# Patient Record
Sex: MALE | Race: WHITE | NOT HISPANIC OR LATINO | Employment: UNEMPLOYED | URBAN - NONMETROPOLITAN AREA
[De-identification: names, ages, dates, MRNs, and addresses within clinical notes are randomized per-mention and may not be internally consistent; named-entity substitution may affect disease eponyms.]

---

## 2019-01-18 ENCOUNTER — APPOINTMENT (OUTPATIENT)
Dept: GENERAL RADIOLOGY | Facility: HOSPITAL | Age: 29
End: 2019-01-18

## 2019-01-18 ENCOUNTER — HOSPITAL ENCOUNTER (EMERGENCY)
Facility: HOSPITAL | Age: 29
Discharge: HOME | End: 2019-01-18
Attending: EMERGENCY MEDICINE | Admitting: EMERGENCY MEDICINE

## 2019-01-18 VITALS
TEMPERATURE: 98.2 F | WEIGHT: 230 LBS | HEIGHT: 75 IN | OXYGEN SATURATION: 98 % | DIASTOLIC BLOOD PRESSURE: 87 MMHG | SYSTOLIC BLOOD PRESSURE: 137 MMHG | HEART RATE: 73 BPM | BODY MASS INDEX: 28.6 KG/M2 | RESPIRATION RATE: 18 BRPM

## 2019-01-18 DIAGNOSIS — R00.2 PALPITATIONS: Primary | ICD-10-CM

## 2019-01-18 DIAGNOSIS — Z20.7 SCABIES EXPOSURE: ICD-10-CM

## 2019-01-18 LAB
ALBUMIN SERPL-MCNC: 5.1 G/DL (ref 3.5–5)
ALBUMIN/GLOB SERPL: 1.8 G/DL (ref 1–2)
ALP SERPL-CCNC: 60 U/L (ref 38–126)
ALT SERPL W P-5'-P-CCNC: 28 U/L (ref 13–69)
ANION GAP SERPL CALCULATED.3IONS-SCNC: 14 MMOL/L (ref 10–20)
AST SERPL-CCNC: 23 U/L (ref 15–46)
BASOPHILS # BLD AUTO: 0.03 10*3/MM3 (ref 0–0.2)
BASOPHILS NFR BLD AUTO: 0.4 % (ref 0–2.5)
BILIRUB SERPL-MCNC: 0.6 MG/DL (ref 0.2–1.3)
BUN BLD-MCNC: 13 MG/DL (ref 7–20)
BUN/CREAT SERPL: 16.3 (ref 6.3–21.9)
CALCIUM SPEC-SCNC: 9.2 MG/DL (ref 8.4–10.2)
CHLORIDE SERPL-SCNC: 103 MMOL/L (ref 98–107)
CO2 SERPL-SCNC: 29 MMOL/L (ref 26–30)
CREAT BLD-MCNC: 0.8 MG/DL (ref 0.6–1.3)
DEPRECATED RDW RBC AUTO: 37.4 FL (ref 37–54)
EOSINOPHIL # BLD AUTO: 0.25 10*3/MM3 (ref 0–0.7)
EOSINOPHIL NFR BLD AUTO: 3.7 % (ref 0–7)
ERYTHROCYTE [DISTWIDTH] IN BLOOD BY AUTOMATED COUNT: 11.7 % (ref 11.5–14.5)
GFR SERPL CREATININE-BSD FRML MDRD: 115 ML/MIN/1.73
GLOBULIN UR ELPH-MCNC: 2.8 GM/DL
GLUCOSE BLD-MCNC: 61 MG/DL (ref 74–98)
HCT VFR BLD AUTO: 47.8 % (ref 42–52)
HGB BLD-MCNC: 17.4 G/DL (ref 14–18)
HOLD SPECIMEN: NORMAL
HOLD SPECIMEN: NORMAL
IMM GRANULOCYTES # BLD AUTO: 0.01 10*3/MM3 (ref 0–0.06)
IMM GRANULOCYTES NFR BLD AUTO: 0.1 % (ref 0–0.6)
LYMPHOCYTES # BLD AUTO: 2.02 10*3/MM3 (ref 0.6–3.4)
LYMPHOCYTES NFR BLD AUTO: 29.6 % (ref 10–50)
MCH RBC QN AUTO: 32.5 PG (ref 27–31)
MCHC RBC AUTO-ENTMCNC: 36.4 G/DL (ref 30–37)
MCV RBC AUTO: 89.2 FL (ref 80–94)
MONOCYTES # BLD AUTO: 0.71 10*3/MM3 (ref 0–0.9)
MONOCYTES NFR BLD AUTO: 10.4 % (ref 0–12)
NEUTROPHILS # BLD AUTO: 3.8 10*3/MM3 (ref 2–6.9)
NEUTROPHILS NFR BLD AUTO: 55.8 % (ref 37–80)
NRBC BLD AUTO-RTO: 0 /100 WBC (ref 0–0)
NT-PROBNP SERPL-MCNC: <11.1 PG/ML (ref 0–125)
PLATELET # BLD AUTO: 186 10*3/MM3 (ref 130–400)
PMV BLD AUTO: 8.9 FL (ref 6–12)
POTASSIUM BLD-SCNC: 4 MMOL/L (ref 3.5–5.1)
PROT SERPL-MCNC: 7.9 G/DL (ref 6.3–8.2)
RBC # BLD AUTO: 5.36 10*6/MM3 (ref 4.7–6.1)
SODIUM BLD-SCNC: 142 MMOL/L (ref 137–145)
TROPONIN I SERPL-MCNC: <0.012 NG/ML (ref 0–0.03)
WBC NRBC COR # BLD: 6.82 10*3/MM3 (ref 4.8–10.8)
WHOLE BLOOD HOLD SPECIMEN: NORMAL
WHOLE BLOOD HOLD SPECIMEN: NORMAL

## 2019-01-18 PROCEDURE — 80053 COMPREHEN METABOLIC PANEL: CPT

## 2019-01-18 PROCEDURE — 84484 ASSAY OF TROPONIN QUANT: CPT

## 2019-01-18 PROCEDURE — 99284 EMERGENCY DEPT VISIT MOD MDM: CPT

## 2019-01-18 PROCEDURE — 83880 ASSAY OF NATRIURETIC PEPTIDE: CPT

## 2019-01-18 PROCEDURE — 85025 COMPLETE CBC W/AUTO DIFF WBC: CPT

## 2019-01-18 PROCEDURE — 93005 ELECTROCARDIOGRAM TRACING: CPT

## 2019-01-18 PROCEDURE — 71046 X-RAY EXAM CHEST 2 VIEWS: CPT

## 2019-01-18 RX ORDER — SODIUM CHLORIDE 0.9 % (FLUSH) 0.9 %
10 SYRINGE (ML) INJECTION AS NEEDED
Status: DISCONTINUED | OUTPATIENT
Start: 2019-01-18 | End: 2019-01-18 | Stop reason: HOSPADM

## 2019-01-18 RX ORDER — PERMETHRIN 50 MG/G
CREAM TOPICAL ONCE
Qty: 60 G | Refills: 0 | Status: SHIPPED | OUTPATIENT
Start: 2019-01-18 | End: 2019-01-18

## 2019-01-19 NOTE — ED PROVIDER NOTES
Subjective   Patient states 2 days ago he was walking up a hill and noted palpitations and pounding in his chest and throat and had pain in the left side of his chest.  He states last about an hour to an hour and a half and then resolved after rest.  He had no shortness of breath, no abdominal pain.  He has never had symptoms like this before.  He denies any intravenous drug use and he states he does smoke marijuana and drink alcohol.  He does smoke cigarettes occasionally.  No family history of coronary disease.  Concerned that he has a rash diffusely to his body that is very pruritic and he is concerned that it is scabies as he recently state in a homeless shelter.  He takes no daily medications and has no significant past medical history.  States the palpitations occurred 2 days ago and since then he has had no symptoms of chest pain or palpitations since.            Review of Systems   All other systems reviewed and are negative.      History reviewed. No pertinent past medical history.    No Known Allergies    History reviewed. No pertinent surgical history.    History reviewed. No pertinent family history.    Social History     Socioeconomic History   • Marital status: Single     Spouse name: Not on file   • Number of children: Not on file   • Years of education: Not on file   • Highest education level: Not on file   Tobacco Use   • Smoking status: Current Every Day Smoker   Substance and Sexual Activity   • Alcohol use: No     Frequency: Never   • Drug use: Yes     Types: Marijuana           Objective   Physical Exam   Constitutional: He appears well-developed and well-nourished.  Non-toxic appearance. He does not appear ill. No distress.   HENT:   Head: Normocephalic and atraumatic.   Neck: Normal range of motion.   Cardiovascular: Regular rhythm.   No murmur heard.  Pulmonary/Chest: Effort normal and breath sounds normal. No respiratory distress. He has no decreased breath sounds. He has no wheezes. He has  no rhonchi. He has no rales.   Abdominal: Soft.   Musculoskeletal: Normal range of motion.        Right lower leg: He exhibits no tenderness and no edema.        Left lower leg: He exhibits no tenderness and no edema.   Neurological: He is alert.   Skin: Skin is warm and dry.   There is a nonspecific diffuse pustular type rash predominantly to the patient's upper and lower extremities.  No evidence of cellulitis or abscess.   Psychiatric: He has a normal mood and affect. His behavior is normal.       Procedures           ED Course  ED Course as of Jan 18 2125 Fri Jan 18, 2019 2023 EKG: Interpreted by me. NSR w/ nl intervals, no ashlee/std. Overall, normal EKG    [AI]      ED Course User Index  [AI] He Ramos MD      9:25 PM  Patient has had no chest pain or palpitations since a few days ago and has remained asymptomatic here.  Suspect rash to body and pruritus is possibly due to scabies given the fact that he is originally state in a homeless shelter.  Unsure of exact etiology of palpitations however no signs of elevated troponin or lab abnormality here.  Patient states that he is not going to be staying local and will be traveling likely lead to [facility] to establish residency.  I encouraged him to establish care with a primary care provider and possibly schizophrenia Holter monitor given his symptoms.            MDM      Final diagnoses:   Palpitations   Scabies exposure            Jason Hernandez PA-C  01/18/19 2125

## 2019-12-01 ENCOUNTER — APPOINTMENT (OUTPATIENT)
Dept: GENERAL RADIOLOGY | Facility: HOSPITAL | Age: 29
End: 2019-12-01

## 2019-12-01 ENCOUNTER — HOSPITAL ENCOUNTER (EMERGENCY)
Facility: HOSPITAL | Age: 29
Discharge: HOME | End: 2019-12-01
Attending: EMERGENCY MEDICINE | Admitting: EMERGENCY MEDICINE

## 2019-12-01 VITALS
SYSTOLIC BLOOD PRESSURE: 144 MMHG | WEIGHT: 230 LBS | HEART RATE: 67 BPM | HEIGHT: 75 IN | BODY MASS INDEX: 28.6 KG/M2 | TEMPERATURE: 99.7 F | RESPIRATION RATE: 16 BRPM | OXYGEN SATURATION: 95 % | DIASTOLIC BLOOD PRESSURE: 75 MMHG
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
## 2019-12-01 DIAGNOSIS — S62.011A CLOSED DISPLACED FRACTURE OF DISTAL POLE OF SCAPHOID BONE OF RIGHT WRIST, INITIAL ENCOUNTER: Primary | ICD-10-CM

## 2019-12-01 PROCEDURE — 73110 X-RAY EXAM OF WRIST: CPT

## 2019-12-01 PROCEDURE — 99283 EMERGENCY DEPT VISIT LOW MDM: CPT

## 2019-12-01 RX ORDER — ACETAMINOPHEN 500 MG
1000 TABLET ORAL EVERY 6 HOURS PRN
Qty: 50 TABLET | Refills: 0 | Status: SHIPPED | OUTPATIENT
Start: 2019-12-01 | End: 2020-11-17

## 2019-12-01 RX ORDER — MELOXICAM 15 MG/1
15 TABLET ORAL DAILY
Qty: 10 TABLET | Refills: 0 | Status: SHIPPED | OUTPATIENT
Start: 2019-12-01 | End: 2020-11-17

## 2019-12-01 RX ORDER — NAPROXEN 500 MG/1
500 TABLET ORAL ONCE
Status: DISCONTINUED | OUTPATIENT
Start: 2019-12-01 | End: 2019-12-01 | Stop reason: HOSPADM

## 2019-12-01 RX ORDER — ACETAMINOPHEN 500 MG
1000 TABLET ORAL ONCE
Status: DISCONTINUED | OUTPATIENT
Start: 2019-12-01 | End: 2019-12-01 | Stop reason: HOSPADM

## 2019-12-01 NOTE — ED PROVIDER NOTES
Subjective   Shaheen Chapman is a 28 y.o.male who presents to the ED with complaints of right hand pain. The patient injured his right hand when he experienced a fall one week ago. During this time, he fell backwards onto grass and tried to catch himself with his right hand. He presents to the emergency department today because his pain has been persistent. He has not taken any medication for his pain. He denies any numbness or tingling to the area. There are no other complaints at this time.         History provided by:  Patient  Hand Injury   Location:  Hand  Hand location:  R hand  Injury: yes    Time since incident:  1 week  Mechanism of injury: fall    Fall:     Fall occurred: fell backwards.    Impact surface:  Grass    Point of impact:  Hands    Entrapped after fall: no    Pain details:     Quality:  Aching    Radiates to:  Does not radiate    Severity:  Moderate    Onset quality:  Sudden    Duration:  1 week    Timing:  Constant    Progression:  Unchanged  Dislocation: no    Foreign body present:  No foreign bodies  Prior injury to area:  No  Relieved by:  None tried  Worsened by:  Movement  Ineffective treatments:  None tried      Review of Systems   Musculoskeletal: Positive for arthralgias.   Neurological: Negative for numbness.   All other systems reviewed and are negative.      No past medical history on file.    No Known Allergies    No past surgical history on file.    No family history on file.    Social History     Socioeconomic History   • Marital status: Significant Other     Spouse name: Not on file   • Number of children: Not on file   • Years of education: Not on file   • Highest education level: Not on file   Tobacco Use   • Smoking status: Current Every Day Smoker   Substance and Sexual Activity   • Alcohol use: No     Frequency: Never   • Drug use: Yes     Types: Marijuana         Objective   Physical Exam   Constitutional: He is oriented to person, place, and time. He appears well-developed  and well-nourished.   Cardiovascular: Intact distal pulses.   Pulmonary/Chest: No respiratory distress.   Musculoskeletal: Normal range of motion. He exhibits tenderness. He exhibits no edema.   Tenderness to lateral palmar aspect of the right hand.    Neurological: He is alert and oriented to person, place, and time.   Skin: Skin is warm and dry. Capillary refill takes less than 2 seconds.   Psychiatric: He has a normal mood and affect. His behavior is normal.   Nursing note and vitals reviewed.      Procedures         ED Course  ED Course as of Dec 01 2026   Sun Dec 01, 2019   1836 X-ray demonstrates findings consistent with avulsion fracture from the distal aspect of the scaphoid.  This is displaced.  We will apply a thumb spica splint.  I discussed with the patient the importance of this fracture with potential long-term impact.  Advised on the splinting as well as critical follow-up with the hand specialist.  He is to call Dr. Urbina tomorrow morning. I had a discussion with the patient/family regarding diagnosis, diagnostic results, treatment plan, and medications.  The patient/family indicated understanding of these instructions.  I spent adequate time at the bedside proceeding discharge necessary to personally discuss the aftercare instructions, giving patient education, providing explanations of the results of our evaluations/findings, and my decision making to assure that the patient/family understand the plan of care.  Time was allotted to answer questions at that time and throughout the ED course.  Emphasis was placed on timely follow-up after discharge.  I also discussed the potential for the development of an acute emergent condition requiring further evaluation, admission, or even surgical intervention. I discussed that we found nothing during the visit today indicating the need for further workup, admission, or the presence of an unstable medical condition.  I encouraged the patient to return to the  "emergency department immediately for ANY concerns, worsening, new complaints, or if symptoms persist and unable to seek follow-up in a timely fashion.  The patient/family expressed understanding and agreement with this plan.   [RS]      ED Course User Index  [RS] Narayan De La Cruz MD     No results found for this or any previous visit (from the past 24 hour(s)).  Note: In addition to lab results from this visit, the labs listed above may include labs taken at another facility or during a different encounter within the last 24 hours. Please correlate lab times with ED admission and discharge times for further clarification of the services performed during this visit.    XR Wrist 3+ View Right   Preliminary Result   Findings concerning for small chip fracture of the lateral   aspect of the scaphoid bone with free-floating fragment.                Vitals:    12/01/19 1711   BP: 144/75   BP Location: Left arm   Patient Position: Sitting   Pulse: 67   Resp: 16   Temp: 99.7 °F (37.6 °C)   TempSrc: Oral   SpO2: 95%   Weight: 104 kg (230 lb)   Height: 190.5 cm (75\")     Medications   acetaminophen (TYLENOL) tablet 1,000 mg (not administered)   naproxen (NAPROSYN) tablet 500 mg (not administered)     ECG/EMG Results (last 24 hours)     ** No results found for the last 24 hours. **        No orders to display                       MDM  Number of Diagnoses or Management Options  Closed displaced fracture of distal pole of scaphoid bone of right wrist, initial encounter:   Fall, initial encounter:      Amount and/or Complexity of Data Reviewed  Tests in the radiology section of CPT®: reviewed  Independent visualization of images, tracings, or specimens: yes        Final diagnoses:   Closed displaced fracture of distal pole of scaphoid bone of right wrist, initial encounter   Fall, initial encounter       Documentation assistance provided by oli Crow.  Information recorded by the oli was done at my direction " and has been verified and validated by me.     Ted Crow  12/01/19 1745       Ted Crow  12/01/19 1836       Narayan De La Cruz MD  12/01/19 2026

## 2020-11-17 ENCOUNTER — OFFICE VISIT (OUTPATIENT)
Dept: FAMILY MEDICINE CLINIC | Facility: CLINIC | Age: 30
End: 2020-11-17

## 2020-11-17 VITALS
SYSTOLIC BLOOD PRESSURE: 128 MMHG | DIASTOLIC BLOOD PRESSURE: 80 MMHG | TEMPERATURE: 97.5 F | HEART RATE: 72 BPM | WEIGHT: 214 LBS | BODY MASS INDEX: 26.61 KG/M2 | HEIGHT: 75 IN | RESPIRATION RATE: 18 BRPM

## 2020-11-17 DIAGNOSIS — F41.9 ANXIETY: ICD-10-CM

## 2020-11-17 DIAGNOSIS — L65.9 HAIR LOSS: Primary | ICD-10-CM

## 2020-11-17 DIAGNOSIS — R23.8 SCALP IRRITATION: ICD-10-CM

## 2020-11-17 PROCEDURE — 99202 OFFICE O/P NEW SF 15 MIN: CPT | Performed by: FAMILY MEDICINE

## 2020-11-17 NOTE — PROGRESS NOTES
"Subjective   Shaheen Chapman is a 29 y.o. male.     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    Review of Systems   Constitutional: Negative.  Negative for unexpected weight change.   HENT: Negative.    Eyes: Negative.    Respiratory: Negative.  Negative for shortness of breath.    Cardiovascular: Negative.  Negative for chest pain.   Gastrointestinal: Negative.  Negative for constipation and diarrhea.   Musculoskeletal: Negative.    Skin:        Scalp irritation   Neurological: Negative.    Psychiatric/Behavioral: The patient is nervous/anxious.    All other systems reviewed and are negative.      Objective   Physical Exam  Vitals signs and nursing note reviewed.   Constitutional:       General: He is not in acute distress.     Appearance: Normal appearance. He is well-developed.   HENT:      Head: Normocephalic and atraumatic.        Right Ear: Tympanic membrane, ear canal and external ear normal.      Left Ear: Tympanic membrane, ear canal and external ear normal.      Nose: Nose normal.   Eyes:      Extraocular Movements: Extraocular movements intact.      Conjunctiva/sclera: Conjunctivae normal.   Neck:      Musculoskeletal: Normal range of motion " and neck supple.      Thyroid: No thyromegaly.   Cardiovascular:      Rate and Rhythm: Normal rate and regular rhythm.      Heart sounds: Normal heart sounds. No murmur.   Pulmonary:      Effort: Pulmonary effort is normal. No respiratory distress.      Breath sounds: Normal breath sounds.   Abdominal:      General: Bowel sounds are normal. There is no distension.      Palpations: Abdomen is soft.      Tenderness: There is no abdominal tenderness.   Lymphadenopathy:      Cervical: No cervical adenopathy.   Skin:     General: Skin is warm and dry.   Neurological:      Mental Status: He is alert and oriented to person, place, and time.   Psychiatric:         Mood and Affect: Mood normal.         Behavior: Behavior normal.         Thought Content: Thought content normal.         Judgment: Judgment normal.         Assessment/Plan   Diagnoses and all orders for this visit:    1. Hair loss (Primary)  -     TSH  -     CBC & Differential  -     Comprehensive Metabolic Panel  -     Ambulatory Referral to Dermatology    2. Scalp irritation  -     Ambulatory Referral to Dermatology    3. 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  He will try selsum blue OTC and set him up with dermatology as he requests.  He will also talk with bosly about his hair and surgical intervention    Mood doing ok, he does not want medicine for mood at this time

## 2020-11-18 LAB
ALBUMIN SERPL-MCNC: 4.7 G/DL (ref 3.5–5.2)
ALBUMIN/GLOB SERPL: 1.9 G/DL
ALP SERPL-CCNC: 67 U/L (ref 39–117)
ALT SERPL-CCNC: 19 U/L (ref 1–41)
AST SERPL-CCNC: 16 U/L (ref 1–40)
BASOPHILS # BLD AUTO: 0.02 10*3/MM3 (ref 0–0.2)
BASOPHILS NFR BLD AUTO: 0.4 % (ref 0–1.5)
BILIRUB SERPL-MCNC: 0.4 MG/DL (ref 0–1.2)
BUN SERPL-MCNC: 19 MG/DL (ref 6–20)
BUN/CREAT SERPL: 23.2 (ref 7–25)
CALCIUM SERPL-MCNC: 9.3 MG/DL (ref 8.6–10.5)
CHLORIDE SERPL-SCNC: 102 MMOL/L (ref 98–107)
CO2 SERPL-SCNC: 28.4 MMOL/L (ref 22–29)
CREAT SERPL-MCNC: 0.82 MG/DL (ref 0.76–1.27)
EOSINOPHIL # BLD AUTO: 0.16 10*3/MM3 (ref 0–0.4)
EOSINOPHIL NFR BLD AUTO: 3.1 % (ref 0.3–6.2)
ERYTHROCYTE [DISTWIDTH] IN BLOOD BY AUTOMATED COUNT: 12 % (ref 12.3–15.4)
GLOBULIN SER CALC-MCNC: 2.5 GM/DL
GLUCOSE SERPL-MCNC: 100 MG/DL (ref 65–99)
HCT VFR BLD AUTO: 49.1 % (ref 37.5–51)
HGB BLD-MCNC: 17.1 G/DL (ref 13–17.7)
IMM GRANULOCYTES # BLD AUTO: 0.01 10*3/MM3 (ref 0–0.05)
IMM GRANULOCYTES NFR BLD AUTO: 0.2 % (ref 0–0.5)
LYMPHOCYTES # BLD AUTO: 1.59 10*3/MM3 (ref 0.7–3.1)
LYMPHOCYTES NFR BLD AUTO: 30.6 % (ref 19.6–45.3)
MCH RBC QN AUTO: 31.7 PG (ref 26.6–33)
MCHC RBC AUTO-ENTMCNC: 34.8 G/DL (ref 31.5–35.7)
MCV RBC AUTO: 91.1 FL (ref 79–97)
MONOCYTES # BLD AUTO: 0.59 10*3/MM3 (ref 0.1–0.9)
MONOCYTES NFR BLD AUTO: 11.4 % (ref 5–12)
NEUTROPHILS # BLD AUTO: 2.82 10*3/MM3 (ref 1.7–7)
NEUTROPHILS NFR BLD AUTO: 54.3 % (ref 42.7–76)
NRBC BLD AUTO-RTO: 0 /100 WBC (ref 0–0.2)
PLATELET # BLD AUTO: 183 10*3/MM3 (ref 140–450)
POTASSIUM SERPL-SCNC: 4.6 MMOL/L (ref 3.5–5.2)
PROT SERPL-MCNC: 7.2 G/DL (ref 6–8.5)
RBC # BLD AUTO: 5.39 10*6/MM3 (ref 4.14–5.8)
SODIUM SERPL-SCNC: 138 MMOL/L (ref 136–145)
TSH SERPL DL<=0.005 MIU/L-ACNC: 0.55 UIU/ML (ref 0.27–4.2)
WBC # BLD AUTO: 5.19 10*3/MM3 (ref 3.4–10.8)

## 2021-02-17 ENCOUNTER — TELEPHONE (OUTPATIENT)
Dept: FAMILY MEDICINE CLINIC | Facility: CLINIC | Age: 31
End: 2021-02-17

## 2021-02-17 DIAGNOSIS — R23.8 SCALP IRRITATION: Primary | ICD-10-CM

## 2021-02-17 NOTE — TELEPHONE ENCOUNTER
Caller: Shaheen Chapman    Relationship: Self    Best call back number: [phone]    What orders are you requesting (i.e. lab or imaging): REFERRAL FOR DERMATOLOGIST    In what timeframe would the patient need to come in: ASAP    Where will you receive your lab/imaging services: [facility]    Additional notes:

## 2021-02-19 ENCOUNTER — TELEPHONE (OUTPATIENT)
Dept: FAMILY MEDICINE CLINIC | Facility: CLINIC | Age: 31
End: 2021-02-19

## 2021-02-19 NOTE — TELEPHONE ENCOUNTER
PATIENT CALLED TO CHECK STATUS OF DERMATOLOGY REFERRAL SCHEDULING.    CALL BACK:  [phone]

## 2024-04-29 ENCOUNTER — HOSPITAL ENCOUNTER (EMERGENCY)
Facility: HOSPITAL | Age: 34
Discharge: HOME | End: 2024-04-29
Attending: EMERGENCY MEDICINE | Admitting: EMERGENCY MEDICINE
Payer: MEDICAID

## 2024-04-29 VITALS
BODY MASS INDEX: 29.84 KG/M2 | WEIGHT: 240 LBS | HEIGHT: 75 IN | TEMPERATURE: 98.7 F | RESPIRATION RATE: 20 BRPM | DIASTOLIC BLOOD PRESSURE: 110 MMHG | HEART RATE: 81 BPM | SYSTOLIC BLOOD PRESSURE: 142 MMHG | OXYGEN SATURATION: 97 %

## 2024-04-29 DIAGNOSIS — L73.9 FOLLICULITIS: Primary | ICD-10-CM

## 2024-04-29 PROCEDURE — 99282 EMERGENCY DEPT VISIT SF MDM: CPT

## 2024-04-29 NOTE — ED PROVIDER NOTES
"Subjective   History of Present Illness  Pt is a 32 yo male presenting to ED with complaints of groin pain. Pt denies significant past medical hx or daily meds. Pt reports noticing bumps and irritation in left groin this morning. He explains he is currently living in a homeless shelter and is worried he has contracted \"fever blisters\" from the shelter and unclean living situations. 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 He has been using zinc oxide to help with chaffing. He denies hx of herpes or STDs. He is not sexually active. He denies any other complaints.     History provided by:  Patient and medical records      Review of Systems   Constitutional:  Negative for fever.   Respiratory:  Negative for cough and shortness of breath.    Cardiovascular:  Negative for chest pain.   Gastrointestinal:  Negative for abdominal pain, diarrhea and vomiting.   Genitourinary:  Negative for difficulty urinating, dysuria, flank pain, hematuria, penile discharge, penile pain, penile swelling, scrotal swelling and testicular pain.   Skin:         Left groin irritation         No past medical history on file.    No Known Allergies    Past Surgical History:   Procedure Laterality Date    NO PAST SURGERIES         Family History   Problem Relation Age of Onset    Brain cancer Mother     No Known Problems Father        Social History     Socioeconomic History    Marital status: Significant Other   Tobacco Use    Smoking status: Some Days    Smokeless tobacco: Never   Substance and Sexual Activity    Alcohol use: Yes     Comment: on occasion    Drug use: Yes     Types: Marijuana    Sexual activity: Yes     Partners: Female     Comment:            Objective   Physical Exam  Vitals and nursing note reviewed.   Constitutional:       General: He is not in acute distress.  HENT:      Head: Atraumatic.      Nose: Nose normal.   Eyes:      Conjunctiva/sclera: Conjunctivae normal.   Cardiovascular:      Rate and " "Rhythm: Normal rate.   Pulmonary:      Effort: Pulmonary effort is normal. No respiratory distress.   Musculoskeletal:         General: Normal range of motion.      Cervical back: Normal range of motion and neck supple.   Skin:     General: Skin is warm.      Findings: Lesion (left groin consistent with folliculitis, no abscess or significant erythema or fluctuance) present. No abscess, erythema or petechiae.   Neurological:      Mental Status: He is alert.   Psychiatric:         Mood and Affect: Mood normal.         Behavior: Behavior normal.         Procedures           ED Course      No results found for this or any previous visit (from the past 24 hour(s)).  Note: In addition to lab results from this visit, the labs listed above may include labs taken at another facility or during a different encounter within the last 24 hours. Please correlate lab times with ED admission and discharge times for further clarification of the services performed during this visit.    No orders to display     Vitals:    04/29/24 1746   BP: (!) 142/110   BP Location: Left arm   Patient Position: Sitting   Pulse: 81   Resp: 20   Temp: 98.7 °F (37.1 °C)   TempSrc: Oral   SpO2: 97%   Weight: 109 kg (240 lb)   Height: 190.5 cm (75\")     Medications   bacitracin ointment 0.9 g (has no administration in time range)     ECG/EMG Results (last 24 hours)       ** No results found for the last 24 hours. **          No orders to display       DISCHARGE    Patient discharged in stable condition.    Reviewed implications of results, diagnosis, meds, responsibility to follow up, warning signs and symptoms of possible worsening, potential complications and reasons to return to ER.    Patient/Family voiced understanding of above instructions.    Discussed plan for discharge, as there is no emergent indication for admission.  Pt/family is agreeable and understands need for follow up and possible repeat testing.  Pt/family is aware that discharge does " not mean that nothing is wrong but that it indicates no emergency is currently present that requires admission and they must continue care with follow-up as given below or with a physician of their choice.     FOLLOW-UP  Lucas Reynolds MD  [address] [facility] [address]  [phone]    Schedule an appointment as soon as possible for a visit       [facility] EMERGENCY DEPARTMENT  [address] [place] [id number] [phone]    If symptoms worsen         Medication List      No changes were made to your prescriptions during this visit.                                              Medical Decision Making  Pt is a 32 yo male presenting to ED with complaints of groin rash. Exam consistent with folliculitis. Provided barrier cream and soap for him to use at the shelter. Also provided a tube of Bactroban. He will f/u with PCP.     DDx  Folliculitis, Abscess, Herpes, Cellulitis, Dermatitis     Problems Addressed:  Folliculitis: acute illness or injury    Amount and/or Complexity of Data Reviewed  External Data Reviewed: notes.     Details: Reviewed previous non ED visits including prior labs, imaging, available notes, medications, allergies and surgical hx.       Risk  OTC drugs.        Final diagnoses:   Folliculitis       ED Disposition  ED Disposition       ED Disposition   Discharge    Condition   Stable    Comment   --               Lucas Reynolds MD  [address] [facility] [address]  [phone]    Schedule an appointment as soon as possible for a visit       [facility] EMERGENCY DEPARTMENT  [address] [place] [id number] [phone]    If symptoms worsen         Medication List      No changes were made to your prescriptions during this visit.            Marina Alford PA  04/29/24 1918

## 2024-11-01 ENCOUNTER — OFFICE VISIT (OUTPATIENT)
Dept: FAMILY MEDICINE CLINIC | Facility: CLINIC | Age: 34
End: 2024-11-01
Payer: MEDICAID

## 2024-11-01 VITALS
WEIGHT: 255 LBS | HEART RATE: 76 BPM | TEMPERATURE: 98.4 F | RESPIRATION RATE: 14 BRPM | DIASTOLIC BLOOD PRESSURE: 90 MMHG | OXYGEN SATURATION: 99 % | HEIGHT: 75 IN | BODY MASS INDEX: 31.71 KG/M2 | SYSTOLIC BLOOD PRESSURE: 150 MMHG

## 2024-11-01 DIAGNOSIS — F10.10 ALCOHOL ABUSE: ICD-10-CM

## 2024-11-01 DIAGNOSIS — R14.0 ABDOMINAL BLOATING: ICD-10-CM

## 2024-11-01 DIAGNOSIS — R10.12 LEFT UPPER QUADRANT ABDOMINAL PAIN: Primary | ICD-10-CM

## 2024-11-01 PROCEDURE — 1160F RVW MEDS BY RX/DR IN RCRD: CPT | Performed by: NURSE PRACTITIONER

## 2024-11-01 PROCEDURE — 99204 OFFICE O/P NEW MOD 45 MIN: CPT | Performed by: NURSE PRACTITIONER

## 2024-11-01 PROCEDURE — 1159F MED LIST DOCD IN RCRD: CPT | Performed by: NURSE PRACTITIONER

## 2024-11-01 NOTE — PROGRESS NOTES
"  Date: 2024   Patient Name: Shaheen Chapman  : 1990   MRN: [id number]     Chief Complaint:    Chief Complaint   Patient presents with    GI Problem     Stomach pain, fatigue, bloating for 1 month. The last couple of weeks have been worse. He's concerned he may have parasites.        History of Present Illness: Shaheen Chapman is a 33 y.o. male who is here today to follow up for Stomach pain, fatigue, bloating for 1 month. The last couple of weeks symptoms  have progressively worsen. He's concerned he may have parasites.  He does report that he has been drinking a lot of alcohol as he did smoke marijuana in the past but is unable to due to trouble in the court system.  And he is having to do random drug test.  He reports that marijuana helps all of his anxiety and depression and also GI problems.  He reports the last time he used marijuana was 4 to 5 months ago.  He is constantly tired/fatigued.  He denies any vomiting, diarrhea, nausea.  He does still have his gallbladder.  Denies any blood in stool or emesis.    GI Problem  The primary symptoms include fatigue and abdominal pain.      Review of Systems:   Review of Systems   Constitutional:  Positive for fatigue.   Gastrointestinal:  Positive for abdominal pain.       I have reviewed the patients family history, social history, past medical history, past surgical history and have updated it as appropriate.     Medications:     Current Outpatient Medications:     omeprazole (priLOSEC) 40 MG capsule, Take 1 capsule by mouth Daily., Disp: 30 capsule, Rfl: 1    Allergies:   No Known Allergies    PHQ-9 Total Score:      Physical Exam:  Vital Signs:   Vitals:    24 1122   BP: 150/90   Pulse: 76   Resp: 14   Temp: 98.4 °F (36.9 °C)   SpO2: 99%   Weight: 116 kg (255 lb)   Height: 190.5 cm (75\")     Body mass index is 31.87 kg/m².   BMI is >= 30 and <35. (Class 1 Obesity). The following options were offered after discussion;: weight loss educational material " (shared in after visit summary)       Physical Exam  Vitals and nursing note reviewed.   Constitutional:       Appearance: Normal appearance.   HENT:      Head: Normocephalic and atraumatic.   Cardiovascular:      Rate and Rhythm: Normal rate and regular rhythm.   Pulmonary:      Effort: Pulmonary effort is normal.      Breath sounds: Normal breath sounds.   Abdominal:      General: Bowel sounds are normal.      Tenderness:  in the epigastric area and left upper quadrant   Skin:     General: Skin is warm.   Neurological:      General: No focal deficit present.      Mental Status: He is alert and oriented to person, place, and time.   Psychiatric:         Mood and Affect: Mood normal.           Assessment/Plan:   Diagnoses and all orders for this visit:    1. Left upper quadrant abdominal pain (Primary)  -     Comprehensive Metabolic Panel  -     CBC Auto Differential  -     T4, Free  -     TSH  -     Amylase  -     Lipase  -     US Abdomen Complete; Future  -     omeprazole (priLOSEC) 40 MG capsule; Take 1 capsule by mouth Daily.  Dispense: 30 capsule; Refill: 1    2. Abdominal bloating  -     Comprehensive Metabolic Panel  -     CBC Auto Differential  -     T4, Free  -     TSH  -     Amylase  -     Lipase  -     US Abdomen Complete; Future  -     omeprazole (priLOSEC) 40 MG capsule; Take 1 capsule by mouth Daily.  Dispense: 30 capsule; Refill: 1    3. Alcohol abuse  -     Comprehensive Metabolic Panel  -     CBC Auto Differential  -     T4, Free  -     TSH  -     Amylase  -     Lipase  -     US Abdomen Complete; Future    Other orders  -     CBC & Differential       Ordering labs. Monitor for worsening symptoms, if symptoms become 10/10 go to the ER.. avoid triggering foods. Decrease alcohol consumption     *30 minutes spent with patient POC, education and medication management.       Follow Up:   Return if symptoms worsen or fail to improve.      Josefina Dahl. ELVIS ESPINOZA [facility]

## 2024-11-04 ENCOUNTER — LAB (OUTPATIENT)
Dept: FAMILY MEDICINE CLINIC | Facility: CLINIC | Age: 34
End: 2024-11-04
Payer: MEDICAID

## 2024-11-05 LAB
ALBUMIN SERPL-MCNC: 4.5 G/DL (ref 3.5–5.2)
ALBUMIN/GLOB SERPL: 1.8 G/DL
ALP SERPL-CCNC: 88 U/L (ref 39–117)
ALT SERPL-CCNC: 60 U/L (ref 1–41)
AMYLASE SERPL-CCNC: 101 U/L (ref 28–100)
AST SERPL-CCNC: 31 U/L (ref 1–40)
BASOPHILS # BLD AUTO: 0.03 10*3/MM3 (ref 0–0.2)
BASOPHILS NFR BLD AUTO: 0.7 % (ref 0–1.5)
BILIRUB SERPL-MCNC: 0.6 MG/DL (ref 0–1.2)
BUN SERPL-MCNC: 16 MG/DL (ref 6–20)
BUN/CREAT SERPL: 18 (ref 7–25)
CALCIUM SERPL-MCNC: 9 MG/DL (ref 8.6–10.5)
CHLORIDE SERPL-SCNC: 100 MMOL/L (ref 98–107)
CO2 SERPL-SCNC: 27.1 MMOL/L (ref 22–29)
CREAT SERPL-MCNC: 0.89 MG/DL (ref 0.76–1.27)
EGFRCR SERPLBLD CKD-EPI 2021: 116 ML/MIN/1.73
EOSINOPHIL # BLD AUTO: 0.12 10*3/MM3 (ref 0–0.4)
EOSINOPHIL NFR BLD AUTO: 2.9 % (ref 0.3–6.2)
ERYTHROCYTE [DISTWIDTH] IN BLOOD BY AUTOMATED COUNT: 12.4 % (ref 12.3–15.4)
GLOBULIN SER CALC-MCNC: 2.5 GM/DL
GLUCOSE SERPL-MCNC: 105 MG/DL (ref 65–99)
HCT VFR BLD AUTO: 51 % (ref 37.5–51)
HGB BLD-MCNC: 17.8 G/DL (ref 13–17.7)
IMM GRANULOCYTES # BLD AUTO: 0.01 10*3/MM3 (ref 0–0.05)
IMM GRANULOCYTES NFR BLD AUTO: 0.2 % (ref 0–0.5)
LIPASE SERPL-CCNC: 26 U/L (ref 13–60)
LYMPHOCYTES # BLD AUTO: 1.5 10*3/MM3 (ref 0.7–3.1)
LYMPHOCYTES NFR BLD AUTO: 36 % (ref 19.6–45.3)
MCH RBC QN AUTO: 32.4 PG (ref 26.6–33)
MCHC RBC AUTO-ENTMCNC: 34.9 G/DL (ref 31.5–35.7)
MCV RBC AUTO: 92.7 FL (ref 79–97)
MONOCYTES # BLD AUTO: 0.54 10*3/MM3 (ref 0.1–0.9)
MONOCYTES NFR BLD AUTO: 12.9 % (ref 5–12)
NEUTROPHILS # BLD AUTO: 1.97 10*3/MM3 (ref 1.7–7)
NEUTROPHILS NFR BLD AUTO: 47.3 % (ref 42.7–76)
NRBC BLD AUTO-RTO: 0 /100 WBC (ref 0–0.2)
PLATELET # BLD AUTO: 192 10*3/MM3 (ref 140–450)
POTASSIUM SERPL-SCNC: 4.9 MMOL/L (ref 3.5–5.2)
PROT SERPL-MCNC: 7 G/DL (ref 6–8.5)
RBC # BLD AUTO: 5.5 10*6/MM3 (ref 4.14–5.8)
SODIUM SERPL-SCNC: 137 MMOL/L (ref 136–145)
T4 FREE SERPL-MCNC: 1.23 NG/DL (ref 0.92–1.68)
TSH SERPL DL<=0.005 MIU/L-ACNC: 0.67 UIU/ML (ref 0.27–4.2)
WBC # BLD AUTO: 4.17 10*3/MM3 (ref 3.4–10.8)

## 2024-11-06 RX ORDER — OMEPRAZOLE 40 MG/1
40 CAPSULE, DELAYED RELEASE ORAL DAILY
Qty: 30 CAPSULE | Refills: 1 | Status: SHIPPED | OUTPATIENT
Start: 2024-11-06

## 2024-12-09 ENCOUNTER — HOSPITAL ENCOUNTER (OUTPATIENT)
Dept: ULTRASOUND IMAGING | Facility: HOSPITAL | Age: 34
Discharge: HOME | End: 2024-12-09
Admitting: NURSE PRACTITIONER
Payer: MEDICAID

## 2024-12-09 DIAGNOSIS — R14.0 ABDOMINAL BLOATING: ICD-10-CM

## 2024-12-09 DIAGNOSIS — F10.10 ALCOHOL ABUSE: ICD-10-CM

## 2024-12-09 DIAGNOSIS — R10.12 LEFT UPPER QUADRANT ABDOMINAL PAIN: ICD-10-CM

## 2024-12-09 PROCEDURE — 76700 US EXAM ABDOM COMPLETE: CPT

## 2025-03-29 ENCOUNTER — HOSPITAL ENCOUNTER (EMERGENCY)
Facility: HOSPITAL | Age: 35
Discharge: HOME | End: 2025-03-30
Attending: EMERGENCY MEDICINE
Payer: MEDICAID

## 2025-03-29 VITALS
HEART RATE: 101 BPM | TEMPERATURE: 98 F | SYSTOLIC BLOOD PRESSURE: 150 MMHG | DIASTOLIC BLOOD PRESSURE: 116 MMHG | OXYGEN SATURATION: 97 % | HEIGHT: 75 IN | WEIGHT: 260 LBS | RESPIRATION RATE: 14 BRPM | BODY MASS INDEX: 32.33 KG/M2

## 2025-03-29 DIAGNOSIS — L03.115 CELLULITIS OF RIGHT THIGH: Primary | ICD-10-CM

## 2025-03-29 PROCEDURE — 99283 EMERGENCY DEPT VISIT LOW MDM: CPT

## 2025-03-29 RX ORDER — IBUPROFEN 800 MG/1
800 TABLET, FILM COATED ORAL ONCE
Status: COMPLETED | OUTPATIENT
Start: 2025-03-29 | End: 2025-03-29

## 2025-03-29 RX ORDER — IBUPROFEN 600 MG/1
600 TABLET, FILM COATED ORAL EVERY 8 HOURS PRN
Qty: 15 TABLET | Refills: 0 | Status: SHIPPED | OUTPATIENT
Start: 2025-03-29 | End: 2025-04-03

## 2025-03-29 RX ORDER — SULFAMETHOXAZOLE AND TRIMETHOPRIM 800; 160 MG/1; MG/1
1 TABLET ORAL 2 TIMES DAILY
Qty: 12 TABLET | Refills: 0 | Status: SHIPPED | OUTPATIENT
Start: 2025-03-29 | End: 2025-04-04

## 2025-03-29 RX ORDER — SULFAMETHOXAZOLE AND TRIMETHOPRIM 800; 160 MG/1; MG/1
1 TABLET ORAL ONCE
Status: COMPLETED | OUTPATIENT
Start: 2025-03-29 | End: 2025-03-29

## 2025-03-29 RX ADMIN — IBUPROFEN 800 MG: 800 TABLET, FILM COATED ORAL at 23:47

## 2025-03-29 RX ADMIN — SULFAMETHOXAZOLE AND TRIMETHOPRIM 1 TABLET: 800; 160 TABLET ORAL at 23:47

## 2025-03-30 NOTE — ED PROVIDER NOTES
" EMERGENCY DEPARTMENT ENCOUNTER    Pt Name: Shaheen Chapman  MRN: [id number]  Pt :   1990  Room Number:    Date of encounter:  3/29/2025  PCP: Lucas Reynolds MD  ED Provider: DORA Armas    Historian: Patient    HPI:  Chief Complaint: Boil right thigh    Context: Shaheen Chapman is a 34 y.o. male who presents to the ED c/o a \"boil\" to his right inner thigh. Patient reports that symptoms began approximately three weeks ago, with an initial observation period during the first week. The patient reports an area of tenderness, redness and swelling located on the right inner thigh. Initially, symptoms were observed, but during the second week, the patient experienced improvment. The lesion almost resolved but then persisted and shares that it came back after a hot shower. No fever. The patient expresses concern about the condition, referencing a familial history where a brother experienced similar symptoms during childhood where a surgeon cut out the area and left a large hole in his brothers bottom.   HPI     REVIEW OF SYSTEMS  A chief complaint appropriate review of systems was completed and is negative except as noted in the HPI.     PAST MEDICAL HISTORY  No past medical history on file.    PAST SURGICAL HISTORY  Past Surgical History:   Procedure Laterality Date    NO PAST SURGERIES         FAMILY HISTORY  Family History   Problem Relation Age of Onset    Brain cancer Mother     No Known Problems Father        SOCIAL HISTORY  Social History     Socioeconomic History    Marital status: Single   Tobacco Use    Smoking status: Some Days    Smokeless tobacco: Never   Vaping Use    Vaping status: Never Used   Substance and Sexual Activity    Alcohol use: Yes     Comment: on occasion    Drug use: Yes     Types: Marijuana    Sexual activity: Yes     Partners: Female     Comment:        ALLERGIES  Patient has no known allergies.    PHYSICAL EXAM  Physical Exam  Vitals and nursing note reviewed. " "  Constitutional:       General: He is not in acute distress.     Appearance: Normal appearance. He is not ill-appearing or toxic-appearing.   HENT:      Head: Normocephalic.      Nose: Nose normal.      Mouth/Throat:      Mouth: Mucous membranes are moist.   Eyes:      Extraocular Movements: Extraocular movements intact.   Cardiovascular:      Rate and Rhythm: Normal rate.   Pulmonary:      Effort: Pulmonary effort is normal.   Musculoskeletal:         General: Normal range of motion.      Cervical back: Normal range of motion.      Right upper leg: Tenderness present.        Legs:    Skin:     General: Skin is warm and dry.   Neurological:      General: No focal deficit present.      Mental Status: He is alert.   Psychiatric:         Mood and Affect: Mood normal.         Behavior: Behavior normal.       LAB RESULTS    If labs were ordered, I independently reviewed the results and considered them in treating the patient.    RADIOLOGY  No orders to display     [] Radiologist's Report Reviewed:  I ordered and independently interpreted the above noted radiographic studies.  See radiologist's dictation for official interpretation.      PROCEDURES    Procedures    No orders to display       MEDICATIONS GIVEN IN ER    Medications   ibuprofen (ADVIL,MOTRIN) tablet 800 mg (800 mg Oral Given 3/29/25 2347)   sulfamethoxazole-trimethoprim (BACTRIM DS,SEPTRA DS) 800-160 MG per tablet 1 tablet (1 tablet Oral Given 3/29/25 2347)     MEDICAL DECISION MAKING, PROGRESS, and CONSULTS   Medical Decision Making  34-year-old male presented to the emergency department for evaluation of an area of inflammation, tenderness, and erythema on his right inner thigh, which he described as a \"boil.\" The patient appeared nontoxic upon arrival. Physical examination revealed findings consistent with cellulitis, with no evidence of loculation or fluid collection that would necessitate incision and drainage (I&D). After assessment, the decision was " made to treat the patient with antibiotics and recommend warm compresses, with follow-up to continue on an outpatient basis. In the ED, the patient received an initial dose of antibiotics and an anti-inflammatory medication to address the infection and associated discomfort. He was subsequently discharged with prescriptions for continued antibiotic therapy and anti-inflammatories to manage his condition at home. Return precautions were provided, and the patient was instructed to seek further medical attention if his symptoms worsened or new concerns arose.    Problems Addressed:  Cellulitis of right thigh: complicated acute illness or injury    Risk  Prescription drug management.    Discussion below represents my analysis of pertinent findings related to patient's condition, differential diagnosis, treatment plan and final disposition.    Differential diagnosis: Cellulitis, dermatitis, abscess.      Additional sources  Discussed/ obtained information from independent historians:   [] Spouse  [] Parent  [] Family member  [] Friend  [] EMS   [] Other:  External (non-ED) record review:   [] Inpatient record:   [] Office record:   [] Outpatient record:   [] Prior Outpatient labs:   [] Prior Outpatient radiology:   [] Primary Care record:   [] Outside ED record:   [] Other:   Patient's care impacted by:   [] Diabetes  [] Hypertension  [] Hyperlipidemia  [] Hypothyroidism   [] Coronary Artery Disease  [] Congestive Heart Failure   [] COPD   [] Cancer   [] Obesity  [] GERD   [] Tobacco Abuse   [] Substance Abuse    [] Anxiety   [] Depression   [] Other:   Care significantly affected by Social Determinants of Health (housing and economic circumstances, unemployment)    [] Yes     [x] No   If yes, Patient's care significantly limited by  Social Determinants of Health including:   [] Inadequate housing   [] Low income   [] Alcoholism and drug addiction in family   [] Problems related to primary support group   []  Unemployment   [] Problems related to employment   [] Other Social Determinants of Health:     Orders placed during this visit:  No orders of the defined types were placed in this encounter.  I considered prescription management  with:   [] Pain medication  [] Antiviral  [x] Antibiotic: implemented as prescribed for treatment of cellulitis.    [] Other:   Rationale:  ED Course:    ED Course as of 03/29/25 2352   Sat Mar 29, 2025   2228 Vitals and Telemetry tracing was reviewed and directly interpreted by myself demonstrating blood pressure 150/116, temperature 98 °F, heart rate of 101, respirations 14 breaths/min and oxygen saturation 97% on room air [JG]   2228 BP(!): 150/116 [JG]   2228 Temp: 98 °F (36.7 °C) [JG]   2228 Heart Rate: 101 [JG]   2228 Resp: 14 [JG]   2228 SpO2: 97 % [JG]      ED Course User Index  [JG] Abhi Campbell, PA          DIAGNOSIS  Final diagnoses:   Cellulitis of right thigh     DISPOSITION    ED Disposition       ED Disposition   Discharge    Condition   Stable    Comment   --           DISCHARGE    Patient discharged in stable condition.    Reviewed implications of results, diagnosis, meds, responsibility to follow up, warning signs and symptoms of possible worsening, potential complications and reasons to return to ER.    Patient/Family voiced understanding of above instructions.    Discussed plan for discharge, as there is no emergent indication for admission.  Pt/family is agreeable and understands need for follow up and possible repeat testing.  Pt/family is aware that discharge does not mean that nothing is wrong but that it indicates no emergency is currently present that requires admission and they must continue care with follow-up as given below or with a physician of their choice.     FOLLOW-UP  Lucas Reynolds MD  [facility] [address] [phone]    Call   As needed, If symptoms worsen, For wound re-check         Medication List        New Prescriptions       ibuprofen 600 MG tablet  Commonly known as: ADVIL,MOTRIN  Take 1 tablet by mouth Every 8 (Eight) Hours As Needed for Mild Pain or Moderate Pain for up to 5 days.     sulfamethoxazole-trimethoprim 800-160 MG per tablet  Commonly known as: BACTRIM DS,SEPTRA DS  Take 1 tablet by mouth 2 (Two) Times a Day for 6 days.               Where to Get Your Medications        These medications were sent to [facility]/pharmacy #[id number] - [place], [place] - [address] [place] [address] AT [address] [phone] [facility] [phone] [address] [address]      Hours: 24-hours Phone: [phone]   ibuprofen 600 MG tablet  sulfamethoxazole-trimethoprim 800-160 MG per tablet        Please note that portions of this document were completed with voice recognition software.        Abhi Campbell PA  03/29/25 [id number]

## 2025-03-30 NOTE — DISCHARGE INSTRUCTIONS
Symptomatic care is recommended. Take all medications as prescribed and instructed.  Take and complete full course of antibiotics as prescribed.  Use warm compress as discussed.  Follow up with primary care as directed or return to Emergency Department with worsening of symptoms.